# Patient Record
Sex: FEMALE | Race: WHITE | NOT HISPANIC OR LATINO | Employment: OTHER | URBAN - METROPOLITAN AREA
[De-identification: names, ages, dates, MRNs, and addresses within clinical notes are randomized per-mention and may not be internally consistent; named-entity substitution may affect disease eponyms.]

---

## 2022-07-19 ENCOUNTER — TRANSFERRED RECORDS (OUTPATIENT)
Dept: HEALTH INFORMATION MANAGEMENT | Facility: CLINIC | Age: 77
End: 2022-07-19

## 2022-09-12 ENCOUNTER — REFERRAL (OUTPATIENT)
Dept: TRANSPLANT | Facility: CLINIC | Age: 77
End: 2022-09-12

## 2022-09-12 NOTE — LETTER
Hellen Pereira  [address]  [facility] [address]                September 13, 2022                                                                                      MEDICAL RECORDS REQUEST- [facility] Kidney, Kidney Pancreas Transplant Program Records Request                      Facility: [facility] Nephrology- Kori Tan CNP    Thank you for referring your patient to the [facility] Kidney, Kidney Pancreas Program, in order to process the referral we will need the following information;    1. Demographics and Insurance Information  2. Most recent abdominal imaging reports- CT, US, MRI    Please call our office at [phone] if you have any questions or concerns.                Please fax all paper records to [phone] ASAP      Thank you,   The [facility] Referral Intake Team     [facility]  Solid Organ Transplant Office  [address] [place] [id number]

## 2022-09-13 VITALS — WEIGHT: 191 LBS | BODY MASS INDEX: 32.61 KG/M2 | HEIGHT: 64 IN

## 2022-09-13 NOTE — TELEPHONE ENCOUNTER
"PCP: No PCP- was Ambar Feng but she left [facility] and now she sees anyone available there  Referring Provider: Kori Tan NP  Referring Diagnosis: CKD Stage 5    Is patient under the age of 65? no  Is patient diabetic? yes  Is patient on insulin? no  Was patient offered a pancreas transplant referral? no    Is patient in a group home/assisted living? no  Does patient have a guardian? No    Patient stated her family is inquiring about a kidney transplant for her but she knows she is not a candidate. She said she is not wanting to drive to [place] 3 times a week for dialysis. I encouraged her to talk to her transplant coordinator to gain knowledge of what is involved with the evaluation and procedure. When I tried to schedule a tentative evaluation she asked where it would be done. When I told her at [facility]. She said,  \"no I don't think I will be going there, its too far and I would need a .\"     Referral intake process completed.  Patient is aware that after financial approval is received, medical records will be requested.   Patient confirmed for a callback from transplant coordinator on September 19, 2022. (within 2 weeks)  Tentative evaluation date TBD-patient declined scheduling at this time (within 4 weeks) if appointment is virtual, does patient have capabilities of setting this up?     Confirmed coordinator will discuss evaluation process in more detail at the time of their call.   Patient is aware of the need to arrange age appropriate cancer screening, vaccinations, and dental care.  Reminded patient to complete questionnaire, complete medical records release, and review packet prior to evaluation visit .  Assessed patient for special needs (ie-wheelchair, assistance, guardian, and ):  none   Patient instructed to call [phone] with questions.     Patient gave verbal consent during intake call to obtain medical records and documents outside of " [facility]:  yes

## 2022-09-14 NOTE — TELEPHONE ENCOUNTER
Pt decided not to proceed with evaluation for a kidney transplant  I informed her if she changes her mind to give us a  Call back

## 2022-09-14 NOTE — TELEPHONE ENCOUNTER
Spoke with Dr. Gonzalez's office. Hellen has called and discussed her decision with them and they will continue to support her care.